# Patient Record
Sex: FEMALE | Race: WHITE | NOT HISPANIC OR LATINO | Employment: PART TIME | URBAN - METROPOLITAN AREA
[De-identification: names, ages, dates, MRNs, and addresses within clinical notes are randomized per-mention and may not be internally consistent; named-entity substitution may affect disease eponyms.]

---

## 2021-08-05 ENCOUNTER — TELEPHONE (OUTPATIENT)
Dept: OBGYN CLINIC | Facility: CLINIC | Age: 46
End: 2021-08-05

## 2021-08-05 DIAGNOSIS — A60.9 HSV (HERPES SIMPLEX VIRUS) ANOGENITAL INFECTION: Primary | ICD-10-CM

## 2021-08-05 RX ORDER — VALACYCLOVIR HYDROCHLORIDE 500 MG/1
500 TABLET, FILM COATED ORAL 2 TIMES DAILY
Qty: 6 TABLET | Refills: 2 | Status: SHIPPED | OUTPATIENT
Start: 2021-08-05 | End: 2021-08-17

## 2021-08-05 NOTE — TELEPHONE ENCOUNTER
Patient notified rx sent  Will need well exam prior to additional refills being provided   Patient verbalized understanding

## 2021-08-05 NOTE — TELEPHONE ENCOUNTER
Maricel Patel l/m she has not had a herpes outbreak for more than 15 years  She is having one currently and would like medication  Currently with labial blisters  Syeda Roque been under a lot of stress with ailing parents  She was in for well exam August 2020  She currently does not have insurance but will be returning to work in October and will schedule [place] once insurance is active again  Requesting Rx be sent to [facility]  Advised will forward request to Sherie Wisdom for review

## 2021-08-16 ENCOUNTER — VBI (OUTPATIENT)
Dept: ADMINISTRATIVE | Facility: OTHER | Age: 46
End: 2021-08-16

## 2021-08-16 PROBLEM — Z80.3 FH: BREAST CANCER: Status: ACTIVE | Noted: 2021-08-16

## 2021-08-16 PROBLEM — A60.9 HSV (HERPES SIMPLEX VIRUS) ANOGENITAL INFECTION: Status: ACTIVE | Noted: 2021-08-16

## 2021-08-16 NOTE — TELEPHONE ENCOUNTER
Upon review of the In Basket request we were able to locate, review, and update the patient chart as requested for Pap Smear (HPV) aka Cervical Cancer Screening  Any additional questions or concerns should be emailed to the Practice Liaisons via [email or web address] email, please do not reply via In Basket      Thank you  Mary Smith
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
## 2021-08-17 ENCOUNTER — OFFICE VISIT (OUTPATIENT)
Dept: OBGYN CLINIC | Facility: CLINIC | Age: 46
End: 2021-08-17

## 2021-08-17 VITALS
HEIGHT: 66 IN | DIASTOLIC BLOOD PRESSURE: 64 MMHG | BODY MASS INDEX: 22.34 KG/M2 | WEIGHT: 139 LBS | SYSTOLIC BLOOD PRESSURE: 104 MMHG

## 2021-08-17 DIAGNOSIS — A60.9 HSV (HERPES SIMPLEX VIRUS) ANOGENITAL INFECTION: ICD-10-CM

## 2021-08-17 DIAGNOSIS — B37.3 VAGINAL YEAST INFECTION: ICD-10-CM

## 2021-08-17 DIAGNOSIS — N76.2 ACUTE VULVITIS: Primary | ICD-10-CM

## 2021-08-17 PROCEDURE — 99213 OFFICE O/P EST LOW 20 MIN: CPT | Performed by: NURSE PRACTITIONER

## 2021-08-17 RX ORDER — CLOTRIMAZOLE AND BETAMETHASONE DIPROPIONATE 10; .64 MG/G; MG/G
CREAM TOPICAL 2 TIMES DAILY
Qty: 45 G | Refills: 0 | Status: SHIPPED | OUTPATIENT
Start: 2021-08-17

## 2021-08-17 RX ORDER — FLUCONAZOLE 150 MG/1
150 TABLET ORAL ONCE
Qty: 2 TABLET | Refills: 1 | Status: SHIPPED | OUTPATIENT
Start: 2021-08-17 | End: 2021-08-17

## 2021-08-17 NOTE — PATIENT INSTRUCTIONS
Open to air at bedtime  Cotton underwear  No thongs  Wear looser fitting clothing  Get out of exercise clothes and bathing suits ASAP  Avoid bathroom wipes, feminine washes/scented soaps  Wash with cetaphil cleanser  Watch sugar and carb intake  Refrain from sexual activity while on treatment     Lotrisone cream bid x 2 weeks then daily x 2 weeks then stop  Diflucan 1 now

## 2023-03-06 ENCOUNTER — TELEPHONE (OUTPATIENT)
Dept: DERMATOLOGY | Facility: CLINIC | Age: 48
End: 2023-03-06

## 2023-03-06 ENCOUNTER — OFFICE VISIT (OUTPATIENT)
Dept: DERMATOLOGY | Facility: CLINIC | Age: 48
End: 2023-03-06

## 2023-03-06 VITALS — BODY MASS INDEX: 24.68 KG/M2 | HEIGHT: 66 IN | WEIGHT: 153.6 LBS

## 2023-03-06 DIAGNOSIS — D48.5 NEOPLASM OF UNCERTAIN BEHAVIOR OF SKIN: Primary | ICD-10-CM

## 2023-03-06 DIAGNOSIS — L60.3 NAIL DYSTROPHY: ICD-10-CM

## 2023-03-06 NOTE — PROGRESS NOTES
[facility] Note     Patient Name: Leidy Sy  Encounter Date: 03/06/2023     Have you been cared for by a [place] [address] Dermatologist in the last 3 years and, if so, which description applies to you? NO  I am considered a "new" patient and must complete all patient intake questions  I am FEMALE/of child-bearing potential     REVIEW OF SYSTEMS:  Have you recently had or currently have any of the following? · Recent fever or chills? No  · Any non-healing wound? No  · Are you pregnant or planning to become pregnant? No  · Are you currently or planning to be nursing or breast feeding? No   PAST MEDICAL HISTORY:  Have you personally ever had or currently have any of the following? If "YES," then please provide more detail  · Skin cancer (such as Melanoma, Basal Cell Carcinoma, Squamous Cell Carcinoma? No  · Tuberculosis, HIV/AIDS, Hepatitis B or C: No  · Systemic Immunosuppression such as Diabetes, Biologic or Immunotherapy, Chemotherapy, Organ Transplantation, Bone Marrow Transplantation No  · Radiation Treatment No   FAMILY HISTORY:  Any "first degree relatives" (parent, brother, sister, or child) with the following? • Skin Cancer, Pancreatic or Other Cancer? YES, Father-Basal Cell Carcinoma   PATIENT EXPERIENCE:    • Do you want the Dermatologist to perform a COMPLETE skin exam today including a clinical examination under the "bra and underwear" areas? NO  • If necessary, do we have your permission to call and leave a detailed message on your Preferred Phone number that includes your specific medical information? Yes      Allergies   Allergen Reactions   • Codeine Other (See Comments)     Unknown     • Compazine [Prochlorperazine] Other (See Comments)     unknown      Current Outpatient Medications:   •  clotrimazole-betamethasone (LOTRISONE) 1-0 05 % cream, Apply topically 2 (two) times a day For 14 days then once daily for 2 weeks  , Disp: 45 g, Rfl: 0  •  valACYclovir (VALTREX) 500 mg tablet, Take 1 tablet (500 mg total) by mouth 2 (two) times a day for 3 days, Disp: 6 tablet, Rfl: 2          • Whom besides the patient is providing clinical information about today's encounter?   o NO ADDITIONAL HISTORIAN (patient alone provided history)    Physical Exam and Assessment/Plan by Diagnosis:    1  NAIL DYSTROPHY- POSSIBLE ONYCHOMYCOSIS  Physical Exam:  • Anatomic Location Affected:  Left thumb nail  • Morphological Description:  Onycholysis distally with ridging and dystrophy of proximal nail  Nail folds normal in appearance  Remaining finger nails appear normal  • Pertinent Positives:  • Pertinent Negatives: Additional History of Present Condition:  Present since winter one year ago  Patient reports it has gotten progressively worse and is painful  Patient took Terbinafine 250 mg tablets for 2 weeks in December but did not improve  She denies rash elsewhere or family history of psoriasis  Assessment and Plan:  Based on a thorough discussion of this condition and the management approach to it (including a comprehensive discussion of the known risks, side effects and potential benefits of treatment), the patient (family) agrees to implement the following specific plan:  • Will obtain nail clipping for fungal culture  • Will verify pricing for culture vs clipping for staining as patient is self pay  • If positive, will obtain labs and begin antifungals  Will require about 6 months of treatment with either terbinafine or fluconazole  • Will consider anti-inflammatory treatment if fungal culture negative  • Keep area hydrated  Moisturize after hand washing  NEOPLASM OF UNCERTAIN BEHAVIOR OF SKIN: PRIMARILY RESOLVED    Physical Exam:  • (Anatomic Location); (Size and Morphological Description); (Differential Diagnosis):  o Left big toe; 1 mm brown macule   • Pertinent Positives:  • Pertinent Negatives: Additional History of Present Condition:  Present since 2/11/23   Patient reports began as a large red spot that has since gotten smaller and mainly healed  She does not have any photos of it when it was more active  Assessment and Plan: Site appears primarily healed today with possible small residual nevus on dorsal toe, but no features concerning for skin cancer  Suspect it was some sort of inflammatory lesion that has resolved  No hypo/depigmentation or features concerning for regression of a melanocytic lesion today    • Based on a thorough discussion of this condition and the management approach to it (including a comprehensive discussion of the known risks, side effects and potential benefits of treatment), the patient (family) agrees to implement the following specific plan:  Continue to monitor for any changes or recurrence   If it begins to grow, please send a photo on My Chart or schedule OVS for evaluation      Scribe Attestation    I,:  Crescencio Tolentino am acting as a scribe while in the presence of the attending physician :       I,:  Efrain Trimble MD personally performed the services described in this documentation    as scribed in my presence :

## 2023-03-06 NOTE — TELEPHONE ENCOUNTER
Left voicemail for patient regarding pricing for fungal culture and stain discussed during today's visit with Dr Guillermo Plan  Per Zulema in finance, fungal culture price range would be between $40 50-approximately $125  The approximate range for the PAS stain is $  Advise patient to call or send message on My Chart with how she would like us to proceed

## 2023-03-06 NOTE — TELEPHONE ENCOUNTER
Patient called back responding saying whatever the doctor thinks best, she will do  She is fine with either, please give patient call back with next steps, thank you!

## 2023-03-15 NOTE — RESULT ENCOUNTER NOTE
Results reviewed showing clipping negative for fungus  Will notify patient and request updated photos for further planning  Component   Case Report  Surgical Pathology Report                         Case: [id number]                                   Authorizing Provider: Muriel Singer MD     Collected:           03/07/2023 1245              Ordering Location:     [facility]      Received:            03/07/2023 [facility]                                     [place]                                                                 Pathologist:           Muriel Singer MD                                                       Specimen:    Nail, Nail clipping for PAS                                                              Final Diagnosis  A  Nail, Clipping for PAS:  Negative for fungal elements  (See note)     Note: PAS and GMS were reviewed  Fungal hyphae are not identified in examined sections      Electronically signed by Muriel Singer MD on 3/15/2023 at 10:41 AM  Additional Information   All reported additional testing was performed with appropriately reactive controls   These tests were developed and their performance characteristics determined by [facility] or appropriate performing facility, though some tests may be performed on tissues which have not been validated for performance characteristics (such as staining performed on alcohol exposed cell blocks and decalcified tissues)   Results should be interpreted with caution and in the context of the patients' clinical condition  These tests may not be cleared or approved by the [place]  Food and Drug Administration, though the FDA has determined that such clearance or approval is not necessary  These tests are used for clinical purposes and they should not be regarded as investigational or for research   This laboratory has been approved by CLIA 88, designated as a high-complexity laboratory and is qualified to perform these tests  Aron Bhagat Description   A  The specimen is received in formalin, labeled with the patient's name and hospital number, and is designated " nail clipping for PAS"  It consists of multiple irregular to filamentous, tan-white fragments of firm material ranging from minute to 0 2 cm in greatest dimension  The specimen is filtered and submitted in toto in a biopsy bag in cassette A1      Please note:  The specimen may not survive processing      Note: The estimated total formalin fixation time based upon information provided by the submitting clinician and the standard processing schedule is under 72 hours      Nannette

## 2023-03-21 ENCOUNTER — TELEPHONE (OUTPATIENT)
Dept: DERMATOLOGY | Facility: CLINIC | Age: 48
End: 2023-03-21

## 2023-03-21 NOTE — TELEPHONE ENCOUNTER
Called and spoke to Ye in regard to her questions surrounding billing and how to best proceed forward with treatment  Patient was given information about the [facility] and was given their number

## 2023-03-24 DIAGNOSIS — L60.3 NAIL DYSTROPHY: Primary | ICD-10-CM
